# Patient Record
Sex: FEMALE | Race: WHITE | NOT HISPANIC OR LATINO | URBAN - NONMETROPOLITAN AREA
[De-identification: names, ages, dates, MRNs, and addresses within clinical notes are randomized per-mention and may not be internally consistent; named-entity substitution may affect disease eponyms.]

---

## 2021-11-29 ENCOUNTER — LAB OUTSIDE AN ENCOUNTER (OUTPATIENT)
Dept: URBAN - NONMETROPOLITAN AREA CLINIC 2 | Facility: CLINIC | Age: 61
End: 2021-11-29

## 2021-11-29 ENCOUNTER — CLAIMS CREATED FROM THE CLAIM WINDOW (OUTPATIENT)
Dept: URBAN - NONMETROPOLITAN AREA CLINIC 2 | Facility: CLINIC | Age: 61
End: 2021-11-29
Payer: OTHER GOVERNMENT

## 2021-11-29 ENCOUNTER — WEB ENCOUNTER (OUTPATIENT)
Dept: URBAN - NONMETROPOLITAN AREA CLINIC 2 | Facility: CLINIC | Age: 61
End: 2021-11-29

## 2021-11-29 ENCOUNTER — DASHBOARD ENCOUNTERS (OUTPATIENT)
Age: 61
End: 2021-11-29

## 2021-11-29 VITALS
BODY MASS INDEX: 20.86 KG/M2 | SYSTOLIC BLOOD PRESSURE: 150 MMHG | HEART RATE: 88 BPM | HEIGHT: 64 IN | WEIGHT: 122.2 LBS | TEMPERATURE: 97.6 F | DIASTOLIC BLOOD PRESSURE: 90 MMHG

## 2021-11-29 DIAGNOSIS — B96.81 H. PYLORI INFECTION: ICD-10-CM

## 2021-11-29 DIAGNOSIS — Z12.11 COLON CANCER SCREENING: ICD-10-CM

## 2021-11-29 DIAGNOSIS — R13.10 ODYNOPHAGIA: ICD-10-CM

## 2021-11-29 DIAGNOSIS — K21.9 GERD (GASTROESOPHAGEAL REFLUX DISEASE): ICD-10-CM

## 2021-11-29 DIAGNOSIS — K59.00 CONSTIPATION: ICD-10-CM

## 2021-11-29 DIAGNOSIS — K59.09 CHANGE IN BOWEL MOVEMENTS INTERMITTENT CONSTIPATION. URGENCY IN THE MORNING.: ICD-10-CM

## 2021-11-29 PROBLEM — 30233002: Status: ACTIVE | Noted: 2021-11-29

## 2021-11-29 PROCEDURE — 99204 OFFICE O/P NEW MOD 45 MIN: CPT | Performed by: NURSE PRACTITIONER

## 2021-11-29 RX ORDER — ESOMEPRAZOLE MAGNESIUM 40 MG/1
1 CAPSULE CAPSULE, DELAYED RELEASE ORAL BID
Qty: 180 CAPSULE | Refills: 3 | OUTPATIENT
Start: 2021-11-29

## 2021-11-29 RX ORDER — DOCUSATE SODIUM 100 MG/1
TAKE 2 CAPSULES (200 MG) BY ORAL ROUTE ONCE DAILY AT BEDTIME AS NEEDED FOR 30 DAYS CAPSULE ORAL 1
Qty: 60 | Refills: 3 | COMMUNITY
Start: 2017-10-24

## 2021-11-29 RX ORDER — LOSARTAN POTASSIUM 50 MG/1
TABLET ORAL
Qty: 0 | Refills: 0 | COMMUNITY
Start: 1900-01-01

## 2021-11-29 RX ORDER — SUCRALFATE 1 G
1 TABLET ON AN EMPTY STOMACH TABLET ORAL QID
Qty: 120 TABLET | Refills: 2 | OUTPATIENT
Start: 2021-11-29 | End: 2022-02-27

## 2021-11-29 RX ORDER — AMLODIPINE BESYLATE 5 MG/1
TABLET ORAL
Qty: 0 | Refills: 0 | COMMUNITY
Start: 1900-01-01

## 2021-11-29 RX ORDER — LINACLOTIDE 145 UG/1
1 CAPSULE AT LEAST 30 MINUTES BEFORE THE FIRST MEAL OF THE DAY ON AN EMPTY STOMACH CAPSULE, GELATIN COATED ORAL ONCE A DAY
Qty: 90 | Refills: 3 | OUTPATIENT
Start: 2021-11-29 | End: 2022-11-24

## 2021-11-29 NOTE — HPI-TODAY'S VISIT:
11/29/2021 Ms. Opal Fragoso is a 61 year old female here for painful swallowing and epigastric pain. She was last seen in 2018 for constipation improved with linzess 145mcg and hpylori gastritis. Eradicated with prevpak. She has done ok until earlier this year. Her reflux returned and her PCP started her on nexium. She then developed bronchtitis twice requiring antibx and steroids. Since then, she has had painful swallowing. She denies food getting stuck but will have to regurgitate her food at times. She denies any issues with liquids. She takes ibuprofen daily and Goodys prn. She denies any blood in her stool or melena. She has run out of linzess and back to having constipation. CS

## 2021-11-29 NOTE — HPI-OTHER HISTORIES
8/2018   Ms. Opal Fragoso is here for f.u of constipation, abdominal pain, and Hpylori. She has been constipated for years. She was taking miralax and 4 stimulant laxatives about once a week. She was started on colace and miralax and having a BM every day or every other day. She has trouble remembering in the morning prior to work and then misses it for the day. She continued to have LLQ abdominal pain and started on levsin. This does help. She was started on linzess 145mcg. This is working better but still not a BM daily and continues to have some mild pain. Her colonoscopy was normal except for a TA polyp due 2023.  She denies any blood in her stool. She went to the ER in 2015 for this. Her CT scan showed wall thickening of the proximal jejunum. Repeat imaging showed a normal SB and gastric wall thickening. She was having reflux symptoms improved with omeprazole. She had an EGD with hpylori gastritis. She was given prevpak. She is due for eradication. She is no longer on a PPI. She has some continued reflux.  CS

## 2022-01-27 ENCOUNTER — OFFICE VISIT (OUTPATIENT)
Dept: URBAN - NONMETROPOLITAN AREA SURGERY CENTER 1 | Facility: SURGERY CENTER | Age: 62
End: 2022-01-27

## 2022-02-16 ENCOUNTER — OFFICE VISIT (OUTPATIENT)
Dept: URBAN - NONMETROPOLITAN AREA CLINIC 2 | Facility: CLINIC | Age: 62
End: 2022-02-16